# Patient Record
Sex: MALE | Race: ASIAN
[De-identification: names, ages, dates, MRNs, and addresses within clinical notes are randomized per-mention and may not be internally consistent; named-entity substitution may affect disease eponyms.]

---

## 2020-08-17 NOTE — HP
DATE OF ADMISSION:  08/19/2020

 

Patient to be admitted through the [facility] in the
near

future, date to be determined.  

 

HISTORY:  This is a 50-year-old man who works for the [employer],

who several months ago developed a bulge at the level of the right groin.  It is

occasionally bothersome.  He is concerned that he does lift rather heavy things 
on a

regular basis and has opted to move in the direction of management.  

 

No underlying GI, , or respiratory complaints to suggest predisposition to 
hernia

formation.  

 

PAST MEDICAL HISTORY:  Significant for GERD as well as diabetes.  No known 
history of

heart disease, hypertension, respiratory, renal, or hepatic insufficiency.

 

PAST SURGICAL HISTORY:  Nil.

 

ALLERGIES:  None known.

 

REGULAR MEDICATIONS:  Metformin, Januvia, omeprazole.

 

SOCIAL HISTORY:  Negative tobacco, negative alcohol.

 

FAMILY HISTORY:  Both parents history of diabetes and hypertension.

 

REVIEW OF SYSTEMS:  Otherwise nil.

 

PHYSICAL EXAMINATION:  With the patient examined in the erect position, there 
are

obvious bilateral inguinal hernias, with the right larger than the left.  They 
are

both reducible.  Scrotum is unremarkable.  The abdomen is soft and nontender.  

 

IMPRESSION:  Bilateral inguinal hernias, right larger and somewhat symptomatic. 


 

PLAN:  Bilateral laparoscopic inguinal hernia repair with mesh.  If the 
procedure

cannot be accomplished laparoscopically, patient understands he will undergo an 
open

right inguinal hernia repair with mesh.  

 

Indications, alternatives, possible complications reviewed.  Consent obtained.  

 

Issues that relate to the mesh, which include but are not limited to infection,

migration, and neuritides is explained.  Consent obtained.  

 

Patient to be seen preoperatively by his PMD, Nora Rodrigues.  Please refer to his

notes for the medical details.  

 

 

ELENI [id number]

DD: 08/11/2020 13:43

DT: 08/11/2020 14:03

Job #:  [id number]

cc:  MD BUNNY Lyles

## 2020-08-19 ENCOUNTER — HOSPITAL ENCOUNTER (OUTPATIENT)
Dept: HOSPITAL 74 - FASU | Age: 50
Discharge: HOME | End: 2020-08-19
Attending: SURGERY
Payer: COMMERCIAL

## 2020-08-19 VITALS — TEMPERATURE: 98 F | SYSTOLIC BLOOD PRESSURE: 105 MMHG | HEART RATE: 88 BPM | DIASTOLIC BLOOD PRESSURE: 62 MMHG

## 2020-08-19 VITALS — BODY MASS INDEX: 26.2 KG/M2

## 2020-08-19 DIAGNOSIS — K21.9: ICD-10-CM

## 2020-08-19 DIAGNOSIS — K40.20: Primary | ICD-10-CM

## 2020-08-19 DIAGNOSIS — E11.9: ICD-10-CM

## 2020-08-19 PROCEDURE — 0YUA4JZ SUPPLEMENT BILATERAL INGUINAL REGION WITH SYNTHETIC SUBSTITUTE, PERCUTANEOUS ENDOSCOPIC APPROACH: ICD-10-PCS | Performed by: SURGERY

## 2020-08-19 NOTE — OP
DATE OF OPERATION:  08/19/2020

 

PREOPERATIVE DIAGNOSIS:  Bilateral inguinal hernias.  

 

POSTOPERATIVE DIAGNOSIS:  Bilateral direct inguinal hernias.  

 

PROCEDURE:  Bilateral laparoscopic inguinal hernia repair with mesh.

 

OPERATING SURGEON:  Yady Allison M.D. 

 

FIRST ASSISTANT:  Vel España M.D. 

 

ANESTHESIA:  Niana Mix M.D. (general)

 

HISTORY:  [age]-year-old man who presents with obvious bilateral inguinal

hernias for laparoscopic bilateral inguinal hernia repair with mesh.  
Indications,

alternatives, possible complications reviewed.  Consent obtained.  

 

DESCRIPTION OF PROCEDURE:  With the patient in the supine position, under 
general

anesthesia, the abdomen was prepped and draped in sterile fashion using

chlorhexidine.  

 

Small incision was made just beneath the umbilicus and off to the right of the

midline.  The subcutaneous tissues were .  The anterior rectus sheath 
on the

right was identified and incised.  The rectal muscle fibers were retracted 
laterally

in both directions, exposing the preperitoneal space.  

 

A dissecting balloon was advanced in the preperitoneal space.  The balloon was

insufflated, creating a dissection.  The balloon was removed, leaving the 
structural

collar in place.  

 

The preperitoneal space was then insufflated to an adequate pressure and volume 
using

CO2 gas.  The camera lens was passed through this port and the preperitoneal 
space

visualized.  Under direct vision, an 11-mm port was placed in the midline midway

between the umbilicus and the pubis.  

 

Exploration of the preperitoneal space allowed recognition of the anatomy.  
There

were obvious bilateral direct inguinal hernias noted.  There were small indirect
sacs

bilaterally.  There were no femoral components noted.  

 

First directing our attention to the patient's right side, the cord was 
skeletonized

and the indirect component was reduced.  The right side was ultimately repaired 
with

a piece of 4 x 6-inch Versatex mesh.  

 

The mesh was keyholed and placed in the preperitoneal space.  It was fashioned

anteriorly to the anterior abdominal wall using AbsorbaTacker and counter 
palpation. 

The mesh was tacked superiorly to the ileopubic tract.  The mesh was tacked

inferiorly to Estuardo ligament and the pubic tubercle.  The keyholed leaf was 
wrapped

around the cord and tacked in place, reconstructing the internal ring.  Mesh 
covered

both the direct and indirect spaces.  

 

Now directing our attention to the contralateral side, there were essentially 
mirror

image findings.  The indirect component was again reduced, and the left side was

ultimately fixed with the same mesh and technique as described above for the 
right.

 

At the completion of the repair, the mesh was noted to overlap in the midline. 

Adequate hemostasis was ensured.  

 

The low midline port was removed under direct vision and no bleeding identified.


 

The camera lens and the structural periumbilical port were removed, and the gas 
was

allowed to escape from the preperitoneal space.  

 

The fascia at each of the port sites was closed using interrupted 0 Vicryl 
sutures. 

Both skin wounds were closed using subcuticular 4-0 Biosyn sutures.  

 

COUNT:  Needle, sponge, instrument count correct.  

 

ESTIMATED BLOOD LOSS:  Minimal.  

 

SPECIMEN:  None.

 

IMPLANT:  Mesh x2. 

 

DRAINS:  None.  

 

Patient tolerated the procedure.  The procedure was terminated.  

 

 

YADY ALLISON M.D.

 

[id number]

DD: 08/19/2020 10:55

DT: 08/19/2020 11:41

Job #:  [id number]



cc:  Nora Rodrigues M.D.

MTDD